# Patient Record
Sex: FEMALE | Race: BLACK OR AFRICAN AMERICAN
[De-identification: names, ages, dates, MRNs, and addresses within clinical notes are randomized per-mention and may not be internally consistent; named-entity substitution may affect disease eponyms.]

---

## 2018-03-05 ENCOUNTER — RESULT REVIEW (OUTPATIENT)
Age: 33
End: 2018-03-05

## 2018-03-19 PROBLEM — Z00.00 ENCOUNTER FOR PREVENTIVE HEALTH EXAMINATION: Status: ACTIVE | Noted: 2018-03-19

## 2018-03-20 ENCOUNTER — APPOINTMENT (OUTPATIENT)
Dept: ANTEPARTUM | Facility: CLINIC | Age: 33
End: 2018-03-20

## 2018-05-23 ENCOUNTER — APPOINTMENT (OUTPATIENT)
Dept: ANTEPARTUM | Facility: CLINIC | Age: 33
End: 2018-05-23
Payer: COMMERCIAL

## 2018-05-23 ENCOUNTER — ASOB RESULT (OUTPATIENT)
Age: 33
End: 2018-05-23

## 2018-05-23 PROCEDURE — 76811 OB US DETAILED SNGL FETUS: CPT

## 2018-05-23 PROCEDURE — 76817 TRANSVAGINAL US OBSTETRIC: CPT | Mod: 59

## 2018-10-02 ENCOUNTER — INPATIENT (INPATIENT)
Facility: HOSPITAL | Age: 33
LOS: 1 days | Discharge: HOME | End: 2018-10-04
Attending: OBSTETRICS & GYNECOLOGY | Admitting: OBSTETRICS & GYNECOLOGY
Payer: COMMERCIAL

## 2018-10-02 VITALS
OXYGEN SATURATION: 100 % | SYSTOLIC BLOOD PRESSURE: 116 MMHG | HEART RATE: 100 BPM | DIASTOLIC BLOOD PRESSURE: 72 MMHG | TEMPERATURE: 98 F | RESPIRATION RATE: 18 BRPM

## 2018-10-02 DIAGNOSIS — Z34.80 ENCOUNTER FOR SUPERVISION OF OTHER NORMAL PREGNANCY, UNSPECIFIED TRIMESTER: ICD-10-CM

## 2018-10-02 DIAGNOSIS — O26.899 OTHER SPECIFIED PREGNANCY RELATED CONDITIONS, UNSPECIFIED TRIMESTER: ICD-10-CM

## 2018-10-02 DIAGNOSIS — Z3A.00 WEEKS OF GESTATION OF PREGNANCY NOT SPECIFIED: ICD-10-CM

## 2018-10-02 LAB
BASOPHILS # BLD AUTO: 0.1 K/UL — SIGNIFICANT CHANGE UP (ref 0–0.2)
BASOPHILS NFR BLD AUTO: 0.7 % — SIGNIFICANT CHANGE UP (ref 0–2)
BLD GP AB SCN SERPL QL: NEGATIVE — SIGNIFICANT CHANGE UP
EOSINOPHIL # BLD AUTO: 0 K/UL — SIGNIFICANT CHANGE UP (ref 0–0.5)
EOSINOPHIL NFR BLD AUTO: 0.5 % — SIGNIFICANT CHANGE UP (ref 0–6)
GIANT PLATELETS BLD QL SMEAR: PRESENT — SIGNIFICANT CHANGE UP
HCT VFR BLD CALC: 30 % — LOW (ref 34.5–45)
HGB BLD-MCNC: 10.5 G/DL — LOW (ref 11.5–15.5)
LYMPHOCYTES # BLD AUTO: 2 K/UL — SIGNIFICANT CHANGE UP (ref 1–3.3)
LYMPHOCYTES # BLD AUTO: 25.5 % — SIGNIFICANT CHANGE UP (ref 13–44)
MCHC RBC-ENTMCNC: 25.4 PG — LOW (ref 27–34)
MCHC RBC-ENTMCNC: 34.9 GM/DL — SIGNIFICANT CHANGE UP (ref 32–36)
MCV RBC AUTO: 72.6 FL — LOW (ref 80–100)
MONOCYTES # BLD AUTO: 0.7 K/UL — SIGNIFICANT CHANGE UP (ref 0–0.9)
MONOCYTES NFR BLD AUTO: 8.7 % — SIGNIFICANT CHANGE UP (ref 2–14)
NEUTROPHILS # BLD AUTO: 4.9 K/UL — SIGNIFICANT CHANGE UP (ref 1.8–7.4)
NEUTROPHILS NFR BLD AUTO: 64.6 % — SIGNIFICANT CHANGE UP (ref 43–77)
PLAT MORPH BLD: ABNORMAL
PLATELET # BLD AUTO: 243 K/UL — SIGNIFICANT CHANGE UP (ref 150–400)
RBC # BLD: 4.13 M/UL — SIGNIFICANT CHANGE UP (ref 3.8–5.2)
RBC # FLD: 13.9 % — SIGNIFICANT CHANGE UP (ref 10.3–14.5)
RBC BLD AUTO: SIGNIFICANT CHANGE UP
RH IG SCN BLD-IMP: POSITIVE — SIGNIFICANT CHANGE UP
RH IG SCN BLD-IMP: POSITIVE — SIGNIFICANT CHANGE UP
T PALLIDUM AB TITR SER: NEGATIVE — SIGNIFICANT CHANGE UP
WBC # BLD: 7.6 K/UL — SIGNIFICANT CHANGE UP (ref 3.8–10.5)
WBC # FLD AUTO: 7.6 K/UL — SIGNIFICANT CHANGE UP (ref 3.8–10.5)

## 2018-10-02 RX ORDER — MAGNESIUM HYDROXIDE 400 MG/1
30 TABLET, CHEWABLE ORAL
Qty: 0 | Refills: 0 | Status: DISCONTINUED | OUTPATIENT
Start: 2018-10-03 | End: 2018-10-04

## 2018-10-02 RX ORDER — HYDROCORTISONE 1 %
1 OINTMENT (GRAM) TOPICAL EVERY 4 HOURS
Qty: 0 | Refills: 0 | Status: DISCONTINUED | OUTPATIENT
Start: 2018-10-03 | End: 2018-10-04

## 2018-10-02 RX ORDER — GLYCERIN ADULT
1 SUPPOSITORY, RECTAL RECTAL AT BEDTIME
Qty: 0 | Refills: 0 | Status: DISCONTINUED | OUTPATIENT
Start: 2018-10-03 | End: 2018-10-04

## 2018-10-02 RX ORDER — IBUPROFEN 200 MG
600 TABLET ORAL EVERY 6 HOURS
Qty: 0 | Refills: 0 | Status: COMPLETED | OUTPATIENT
Start: 2018-10-02 | End: 2019-08-31

## 2018-10-02 RX ORDER — SODIUM CHLORIDE 9 MG/ML
1000 INJECTION, SOLUTION INTRAVENOUS
Qty: 0 | Refills: 0 | Status: DISCONTINUED | OUTPATIENT
Start: 2018-10-02 | End: 2018-10-02

## 2018-10-02 RX ORDER — SODIUM CHLORIDE 9 MG/ML
500 INJECTION, SOLUTION INTRAVENOUS ONCE
Qty: 0 | Refills: 0 | Status: COMPLETED | OUTPATIENT
Start: 2018-10-02 | End: 2018-10-02

## 2018-10-02 RX ORDER — OXYTOCIN 10 UNIT/ML
4 VIAL (ML) INJECTION
Qty: 30 | Refills: 0 | Status: DISCONTINUED | OUTPATIENT
Start: 2018-10-02 | End: 2018-10-04

## 2018-10-02 RX ORDER — KETOROLAC TROMETHAMINE 30 MG/ML
30 SYRINGE (ML) INJECTION ONCE
Qty: 0 | Refills: 0 | Status: DISCONTINUED | OUTPATIENT
Start: 2018-10-02 | End: 2018-10-04

## 2018-10-02 RX ORDER — SODIUM CHLORIDE 9 MG/ML
3 INJECTION INTRAMUSCULAR; INTRAVENOUS; SUBCUTANEOUS EVERY 8 HOURS
Qty: 0 | Refills: 0 | Status: DISCONTINUED | OUTPATIENT
Start: 2018-10-02 | End: 2018-10-02

## 2018-10-02 RX ORDER — SIMETHICONE 80 MG/1
80 TABLET, CHEWABLE ORAL EVERY 6 HOURS
Qty: 0 | Refills: 0 | Status: DISCONTINUED | OUTPATIENT
Start: 2018-10-03 | End: 2018-10-04

## 2018-10-02 RX ORDER — CITRIC ACID/SODIUM CITRATE 300-500 MG
15 SOLUTION, ORAL ORAL EVERY 4 HOURS
Qty: 0 | Refills: 0 | Status: DISCONTINUED | OUTPATIENT
Start: 2018-10-02 | End: 2018-10-02

## 2018-10-02 RX ORDER — TETANUS TOXOID, REDUCED DIPHTHERIA TOXOID AND ACELLULAR PERTUSSIS VACCINE, ADSORBED 5; 2.5; 8; 8; 2.5 [IU]/.5ML; [IU]/.5ML; UG/.5ML; UG/.5ML; UG/.5ML
0.5 SUSPENSION INTRAMUSCULAR ONCE
Qty: 0 | Refills: 0 | Status: DISCONTINUED | OUTPATIENT
Start: 2018-10-03 | End: 2018-10-04

## 2018-10-02 RX ORDER — OXYCODONE HYDROCHLORIDE 5 MG/1
5 TABLET ORAL
Qty: 0 | Refills: 0 | Status: DISCONTINUED | OUTPATIENT
Start: 2018-10-02 | End: 2018-10-04

## 2018-10-02 RX ORDER — LANOLIN
1 OINTMENT (GRAM) TOPICAL EVERY 6 HOURS
Qty: 0 | Refills: 0 | Status: DISCONTINUED | OUTPATIENT
Start: 2018-10-03 | End: 2018-10-04

## 2018-10-02 RX ORDER — HYDROCORTISONE 1 %
1 OINTMENT (GRAM) TOPICAL EVERY 4 HOURS
Qty: 0 | Refills: 0 | Status: DISCONTINUED | OUTPATIENT
Start: 2018-10-02 | End: 2018-10-02

## 2018-10-02 RX ORDER — DIBUCAINE 1 %
1 OINTMENT (GRAM) RECTAL EVERY 4 HOURS
Qty: 0 | Refills: 0 | Status: DISCONTINUED | OUTPATIENT
Start: 2018-10-03 | End: 2018-10-04

## 2018-10-02 RX ORDER — DIPHENHYDRAMINE HCL 50 MG
25 CAPSULE ORAL EVERY 6 HOURS
Qty: 0 | Refills: 0 | Status: DISCONTINUED | OUTPATIENT
Start: 2018-10-03 | End: 2018-10-04

## 2018-10-02 RX ORDER — PRAMOXINE HYDROCHLORIDE 150 MG/15G
1 AEROSOL, FOAM RECTAL EVERY 4 HOURS
Qty: 0 | Refills: 0 | Status: DISCONTINUED | OUTPATIENT
Start: 2018-10-02 | End: 2018-10-02

## 2018-10-02 RX ORDER — ACETAMINOPHEN 500 MG
975 TABLET ORAL EVERY 6 HOURS
Qty: 0 | Refills: 0 | Status: COMPLETED | OUTPATIENT
Start: 2018-10-02 | End: 2019-08-31

## 2018-10-02 RX ORDER — OXYCODONE HYDROCHLORIDE 5 MG/1
5 TABLET ORAL EVERY 4 HOURS
Qty: 0 | Refills: 0 | Status: DISCONTINUED | OUTPATIENT
Start: 2018-10-02 | End: 2018-10-04

## 2018-10-02 RX ORDER — DOCUSATE SODIUM 100 MG
100 CAPSULE ORAL
Qty: 0 | Refills: 0 | Status: DISCONTINUED | OUTPATIENT
Start: 2018-10-03 | End: 2018-10-04

## 2018-10-02 RX ORDER — AER TRAVELER 0.5 G/1
1 SOLUTION RECTAL; TOPICAL EVERY 4 HOURS
Qty: 0 | Refills: 0 | Status: DISCONTINUED | OUTPATIENT
Start: 2018-10-03 | End: 2018-10-04

## 2018-10-02 RX ORDER — OXYTOCIN 10 UNIT/ML
41.67 VIAL (ML) INJECTION
Qty: 20 | Refills: 0 | Status: DISCONTINUED | OUTPATIENT
Start: 2018-10-02 | End: 2018-10-02

## 2018-10-02 RX ORDER — OXYTOCIN 10 UNIT/ML
333.33 VIAL (ML) INJECTION
Qty: 20 | Refills: 0 | Status: DISCONTINUED | OUTPATIENT
Start: 2018-10-02 | End: 2018-10-02

## 2018-10-02 RX ORDER — OXYTOCIN 10 UNIT/ML
41.67 VIAL (ML) INJECTION
Qty: 20 | Refills: 0 | Status: DISCONTINUED | OUTPATIENT
Start: 2018-10-03 | End: 2018-10-04

## 2018-10-02 RX ORDER — SODIUM CHLORIDE 9 MG/ML
3 INJECTION INTRAMUSCULAR; INTRAVENOUS; SUBCUTANEOUS EVERY 8 HOURS
Qty: 0 | Refills: 0 | Status: DISCONTINUED | OUTPATIENT
Start: 2018-10-03 | End: 2018-10-04

## 2018-10-02 RX ORDER — PRAMOXINE HYDROCHLORIDE 150 MG/15G
1 AEROSOL, FOAM RECTAL EVERY 4 HOURS
Qty: 0 | Refills: 0 | Status: DISCONTINUED | OUTPATIENT
Start: 2018-10-03 | End: 2018-10-04

## 2018-10-02 RX ORDER — DIBUCAINE 1 %
1 OINTMENT (GRAM) RECTAL EVERY 4 HOURS
Qty: 0 | Refills: 0 | Status: DISCONTINUED | OUTPATIENT
Start: 2018-10-02 | End: 2018-10-02

## 2018-10-02 RX ORDER — AER TRAVELER 0.5 G/1
1 SOLUTION RECTAL; TOPICAL EVERY 4 HOURS
Qty: 0 | Refills: 0 | Status: DISCONTINUED | OUTPATIENT
Start: 2018-10-02 | End: 2018-10-02

## 2018-10-02 RX ADMIN — Medication 4 MILLIUNIT(S)/MIN: at 16:56

## 2018-10-02 RX ADMIN — Medication 125 MILLIUNIT(S)/MIN: at 21:36

## 2018-10-02 RX ADMIN — SODIUM CHLORIDE 1000 MILLILITER(S): 9 INJECTION, SOLUTION INTRAVENOUS at 16:56

## 2018-10-02 RX ADMIN — SODIUM CHLORIDE 250 MILLILITER(S): 9 INJECTION, SOLUTION INTRAVENOUS at 16:56

## 2018-10-03 LAB
HCT VFR BLD CALC: 27.4 % — LOW (ref 34.5–45)
HGB BLD-MCNC: 9.6 G/DL — LOW (ref 11.5–15.5)

## 2018-10-03 RX ORDER — ASCORBIC ACID 60 MG
500 TABLET,CHEWABLE ORAL
Qty: 0 | Refills: 0 | Status: DISCONTINUED | OUTPATIENT
Start: 2018-10-03 | End: 2018-10-04

## 2018-10-03 RX ORDER — ACETAMINOPHEN 500 MG
975 TABLET ORAL EVERY 6 HOURS
Qty: 0 | Refills: 0 | Status: DISCONTINUED | OUTPATIENT
Start: 2018-10-03 | End: 2018-10-04

## 2018-10-03 RX ORDER — IBUPROFEN 200 MG
600 TABLET ORAL EVERY 6 HOURS
Qty: 0 | Refills: 0 | Status: DISCONTINUED | OUTPATIENT
Start: 2018-10-03 | End: 2018-10-04

## 2018-10-03 RX ORDER — FERROUS SULFATE 325(65) MG
325 TABLET ORAL
Qty: 0 | Refills: 0 | Status: DISCONTINUED | OUTPATIENT
Start: 2018-10-03 | End: 2018-10-04

## 2018-10-03 RX ADMIN — Medication 1 TABLET(S): at 12:12

## 2018-10-03 RX ADMIN — SODIUM CHLORIDE 3 MILLILITER(S): 9 INJECTION INTRAMUSCULAR; INTRAVENOUS; SUBCUTANEOUS at 06:36

## 2018-10-03 NOTE — CHART NOTE - NSCHARTNOTEFT_GEN_A_CORE
PA NOTE     Day 1         Vital Signs Last 24 Hrs  T(C): 36.8 (03 Oct 2018 06:39), Max: 37.1 (03 Oct 2018 02:00)  T(F): 98.2 (03 Oct 2018 06:39), Max: 98.8 (03 Oct 2018 02:00)  HR: 83 (03 Oct 2018 06:39) (65 - 100)  BP: 112/72 (03 Oct 2018 06:39) (112/72 - 127/59)  BP(mean): --  RR: 18 (03 Oct 2018 06:39) (16 - 20)  SpO2: 98% (02 Oct 2018 22:25) (97% - 100%)                          9.6    x     )-----------( x        ( 03 Oct 2018 07:00 )             27.4     9.6/27.4  10.5/30.0      Plan:  - Ferrous Sulfate, Colace, Vitamin C supplementation.  - Monitor for signs/symptoms of anemia.   ANDREA Huang who is in house  KATY Ibrahim PA-C

## 2018-10-03 NOTE — PROGRESS NOTE ADULT - PROBLEM SELECTOR PLAN 1
- Pain well controlled, continue current pain regimen  - Increase ambulation  - Continue regular diet    Liliana Dukes PGY-1

## 2018-10-04 ENCOUNTER — TRANSCRIPTION ENCOUNTER (OUTPATIENT)
Age: 33
End: 2018-10-04

## 2018-10-04 VITALS
HEART RATE: 83 BPM | DIASTOLIC BLOOD PRESSURE: 75 MMHG | TEMPERATURE: 98 F | RESPIRATION RATE: 18 BRPM | SYSTOLIC BLOOD PRESSURE: 112 MMHG

## 2018-10-04 PROCEDURE — 86850 RBC ANTIBODY SCREEN: CPT

## 2018-10-04 PROCEDURE — 86900 BLOOD TYPING SEROLOGIC ABO: CPT

## 2018-10-04 PROCEDURE — 59050 FETAL MONITOR W/REPORT: CPT

## 2018-10-04 PROCEDURE — G0463: CPT

## 2018-10-04 PROCEDURE — 86901 BLOOD TYPING SEROLOGIC RH(D): CPT

## 2018-10-04 PROCEDURE — 59025 FETAL NON-STRESS TEST: CPT

## 2018-10-04 PROCEDURE — 85018 HEMOGLOBIN: CPT

## 2018-10-04 PROCEDURE — 85027 COMPLETE CBC AUTOMATED: CPT

## 2018-10-04 PROCEDURE — 85014 HEMATOCRIT: CPT

## 2018-10-04 PROCEDURE — 86780 TREPONEMA PALLIDUM: CPT

## 2018-10-04 RX ORDER — ACETAMINOPHEN 500 MG
3 TABLET ORAL
Qty: 0 | Refills: 0 | COMMUNITY
Start: 2018-10-04

## 2018-10-04 RX ORDER — IBUPROFEN 200 MG
1 TABLET ORAL
Qty: 0 | Refills: 0 | COMMUNITY
Start: 2018-10-04

## 2018-10-04 RX ADMIN — Medication 500 MILLIGRAM(S): at 05:31

## 2018-10-04 RX ADMIN — Medication 325 MILLIGRAM(S): at 05:31

## 2018-10-04 NOTE — PROGRESS NOTE ADULT - PROBLEM SELECTOR PLAN 1
33y PPD#2 s/p  doing well.    Plan:  Analgesia prn  Regular diet  Discharge instruction given  F/U 6 weeks

## 2018-10-04 NOTE — DISCHARGE NOTE OB - MEDICATION SUMMARY - MEDICATIONS TO TAKE
I will START or STAY ON the medications listed below when I get home from the hospital:    acetaminophen 325 mg oral tablet  -- 3 tab(s) by mouth every 6 hours  -- Indication: For for pain    ibuprofen 600 mg oral tablet  -- 1 tab(s) by mouth every 6 hours  -- Indication: For for pain    Prenatal Multivitamins with Folic Acid 1 mg oral tablet  -- 1 tab(s) by mouth once a day  -- Indication: For general wellness

## 2018-10-04 NOTE — DISCHARGE NOTE OB - PATIENT PORTAL LINK FT
You can access the [facility], offered by [facility], by registering with the following website: [email or web address]

## 2018-10-04 NOTE — DISCHARGE NOTE OB - CARE PROVIDER_API CALL
Todd Garnett), Obstetrics and Gynecology  [address] [place] [address]  Phone: [phone]  Fax: [phone]

## 2018-10-04 NOTE — DISCHARGE NOTE OB - CARE PLAN
Principal Discharge DX:	Vaginal delivery  Goal:	establish breastfeeding  Assessment and plan of treatment:	PPD2 s/p , stable for discharge

## 2018-10-04 NOTE — PROGRESS NOTE ADULT - SUBJECTIVE AND OBJECTIVE BOX
OB Progress Note:  PPD#1    S: 32yo  PPD#1 s/p . Patient feels well. Pain is well controlled. She is tolerating a regular diet and passing flatus. She is voiding spontaneously, and ambulating without difficulty. Denies CP/SOB. Denies lightheadedness/dizziness. Denies N/V.    O:  Vitals:  Vital Signs Last 24 Hrs  T(C): 36.8 (03 Oct 2018 06:39), Max: 37.1 (03 Oct 2018 02:00)  T(F): 98.2 (03 Oct 2018 06:39), Max: 98.8 (03 Oct 2018 02:00)  HR: 83 (03 Oct 2018 06:39) (65 - 100)  BP: 112/72 (03 Oct 2018 06:39) (112/72 - 127/59)  BP(mean): --  RR: 18 (03 Oct 2018 06:39) (16 - 20)  SpO2: 98% (02 Oct 2018 22:25) (97% - 100%)    MEDICATIONS  (STANDING):  acetaminophen   Tablet .. 975 milliGRAM(s) Oral every 6 hours  diphtheria/tetanus/pertussis (acellular) Vaccine (ADAcel) 0.5 milliLiter(s) IntraMuscular once  ibuprofen  Tablet. 600 milliGRAM(s) Oral every 6 hours  ketorolac   Injectable 30 milliGRAM(s) IV Push once  oxyCODONE    IR 5 milliGRAM(s) Oral every 3 hours  oxytocin Infusion 4 milliUNIT(s)/Min (4 mL/Hr) IV Continuous <Continuous>  oxytocin Infusion 41.667 milliUNIT(s)/Min (125 mL/Hr) IV Continuous <Continuous>  prenatal multivitamin 1 Tablet(s) Oral daily  sodium chloride 0.9% lock flush 3 milliLiter(s) IV Push every 8 hours      Physical Exam:  General: NAD  Abdomen: soft, non-tender, non-distended, fundus firm  Vaginal: Lochia wnl  Extremities: NTTP, no erythema, no edema    Labs:  Blood type: O Positive  Rubella IgG: RPR: Negative                          10.5<L>   7.6 >-----------< 243    ( 10-02 @ 13:17 )             30.0<L>
OB Attending Note    S: Patient doing well. Minimal lochia. Pain controlled.    O: Vital Signs Last 24 Hrs  T(C): 36.8 (03 Oct 2018 06:39), Max: 37.1 (03 Oct 2018 02:00)  T(F): 98.2 (03 Oct 2018 06:39), Max: 98.8 (03 Oct 2018 02:00)  HR: 83 (03 Oct 2018 06:39) (65 - 100)  BP: 112/72 (03 Oct 2018 06:39) (112/72 - 127/59)      Gen: NAD  Abd: soft, NT, ND, fundus firm below umbilicus  Lochia: min  Perineum healing well  Ext: no tenderness    Labs:                        9.6    x     )-----------( x        ( 03 Oct 2018 07:00 )             27.4       A: 33y PPD#2 s/p  doing well.    Plan: cont PP care  OOB/ambulation  Pain control  iron supplementation
PPD#2- ATTENDING NOTE    S: Patient doing well. Minimal lochia. Pain controlled.    O: Vital Signs Last 24 Hrs  T(C): 36.8 (04 Oct 2018 05:00), Max: 36.8 (04 Oct 2018 05:00)  T(F): 98.2 (04 Oct 2018 05:00), Max: 98.2 (04 Oct 2018 05:00)  HR: 83 (04 Oct 2018 05:00) (83 - 83)  BP: 112/75 (04 Oct 2018 05:00) (112/75 - 112/75)  BP(mean): --  RR: 18 (04 Oct 2018 05:00) (18 - 18)  SpO2: --    Gen: NAD  Abd: soft, NT, ND, fundus firm below umbilicus  Lochia: moderate  Ext: no tenderness, no hyper reflexia    Labs:                        9.6    x     )-----------( x        ( 03 Oct 2018 07:00 )             27.4       A:       Office [phone]  Dr. Dai

## 2025-03-25 ENCOUNTER — INPATIENT (INPATIENT)
Facility: HOSPITAL | Age: 40
LOS: 0 days | Discharge: HOME | DRG: 833 | End: 2025-03-26
Attending: OBSTETRICS & GYNECOLOGY | Admitting: OBSTETRICS & GYNECOLOGY
Payer: COMMERCIAL

## 2025-03-25 VITALS
HEART RATE: 112 BPM | RESPIRATION RATE: 18 BRPM | DIASTOLIC BLOOD PRESSURE: 93 MMHG | TEMPERATURE: 98 F | WEIGHT: 132.06 LBS | SYSTOLIC BLOOD PRESSURE: 164 MMHG | OXYGEN SATURATION: 100 % | HEIGHT: 66 IN

## 2025-03-25 DIAGNOSIS — O00.90 UNSPECIFIED ECTOPIC PREGNANCY WITHOUT INTRAUTERINE PREGNANCY: ICD-10-CM

## 2025-03-25 LAB
ALBUMIN SERPL ELPH-MCNC: 4.3 G/DL — SIGNIFICANT CHANGE UP (ref 3.3–5)
ALP SERPL-CCNC: 42 U/L — SIGNIFICANT CHANGE UP (ref 40–120)
ALT FLD-CCNC: 11 U/L — SIGNIFICANT CHANGE UP (ref 10–45)
ANION GAP SERPL CALC-SCNC: 14 MMOL/L — SIGNIFICANT CHANGE UP (ref 5–17)
ANISOCYTOSIS BLD QL: SLIGHT — SIGNIFICANT CHANGE UP
APPEARANCE UR: CLEAR — SIGNIFICANT CHANGE UP
AST SERPL-CCNC: 13 U/L — SIGNIFICANT CHANGE UP (ref 10–40)
BACTERIA # UR AUTO: ABNORMAL /HPF
BASOPHILS # BLD AUTO: 0 K/UL — SIGNIFICANT CHANGE UP (ref 0–0.2)
BASOPHILS NFR BLD AUTO: 0 % — SIGNIFICANT CHANGE UP (ref 0–2)
BILIRUB SERPL-MCNC: 0.5 MG/DL — SIGNIFICANT CHANGE UP (ref 0.2–1.2)
BILIRUB UR-MCNC: NEGATIVE — SIGNIFICANT CHANGE UP
BLD GP AB SCN SERPL QL: NEGATIVE — SIGNIFICANT CHANGE UP
BUN SERPL-MCNC: 8 MG/DL — SIGNIFICANT CHANGE UP (ref 7–23)
CALCIUM SERPL-MCNC: 9.6 MG/DL — SIGNIFICANT CHANGE UP (ref 8.4–10.5)
CAST: 1 /LPF — SIGNIFICANT CHANGE UP (ref 0–4)
CHLORIDE SERPL-SCNC: 104 MMOL/L — SIGNIFICANT CHANGE UP (ref 96–108)
CO2 SERPL-SCNC: 19 MMOL/L — LOW (ref 22–31)
COLOR SPEC: YELLOW — SIGNIFICANT CHANGE UP
CREAT SERPL-MCNC: 0.59 MG/DL — SIGNIFICANT CHANGE UP (ref 0.5–1.3)
DACRYOCYTES BLD QL SMEAR: SLIGHT — SIGNIFICANT CHANGE UP
DIFF PNL FLD: ABNORMAL
EGFR: 119 ML/MIN/1.73M2 — SIGNIFICANT CHANGE UP
EGFR: 119 ML/MIN/1.73M2 — SIGNIFICANT CHANGE UP
EOSINOPHIL # BLD AUTO: 0 K/UL — SIGNIFICANT CHANGE UP (ref 0–0.5)
EOSINOPHIL NFR BLD AUTO: 0 % — SIGNIFICANT CHANGE UP (ref 0–6)
GIANT PLATELETS BLD QL SMEAR: PRESENT — SIGNIFICANT CHANGE UP
GLUCOSE SERPL-MCNC: 96 MG/DL — SIGNIFICANT CHANGE UP (ref 70–99)
GLUCOSE UR QL: NEGATIVE MG/DL — SIGNIFICANT CHANGE UP
HCG SERPL-ACNC: HIGH MIU/ML
HCG UR QL: POSITIVE
HCT VFR BLD CALC: 31.5 % — LOW (ref 34.5–45)
HCV AB S/CO SERPL IA: 0.06 S/CO — SIGNIFICANT CHANGE UP
HCV AB SERPL-IMP: SIGNIFICANT CHANGE UP
HGB BLD-MCNC: 11 G/DL — LOW (ref 11.5–15.5)
HIV 1 & 2 AB SERPL IA.RAPID: SIGNIFICANT CHANGE UP
HYPOCHROMIA BLD QL: SLIGHT — SIGNIFICANT CHANGE UP
KETONES UR-MCNC: 15 MG/DL
LEUKOCYTE ESTERASE UR-ACNC: NEGATIVE — SIGNIFICANT CHANGE UP
LYMPHOCYTES # BLD AUTO: 1.91 K/UL — SIGNIFICANT CHANGE UP (ref 1–3.3)
LYMPHOCYTES # BLD AUTO: 25.2 % — SIGNIFICANT CHANGE UP (ref 13–44)
MACROCYTES BLD QL: SLIGHT — SIGNIFICANT CHANGE UP
MANUAL SMEAR VERIFICATION: SIGNIFICANT CHANGE UP
MCHC RBC-ENTMCNC: 24.2 PG — LOW (ref 27–34)
MCHC RBC-ENTMCNC: 34.9 G/DL — SIGNIFICANT CHANGE UP (ref 32–36)
MCV RBC AUTO: 69.2 FL — LOW (ref 80–100)
MICROCYTES BLD QL: SIGNIFICANT CHANGE UP
MONOCYTES # BLD AUTO: 0.52 K/UL — SIGNIFICANT CHANGE UP (ref 0–0.9)
MONOCYTES NFR BLD AUTO: 6.9 % — SIGNIFICANT CHANGE UP (ref 2–14)
NEUTROPHILS # BLD AUTO: 5.07 K/UL — SIGNIFICANT CHANGE UP (ref 1.8–7.4)
NEUTROPHILS NFR BLD AUTO: 67 % — SIGNIFICANT CHANGE UP (ref 43–77)
NITRITE UR-MCNC: NEGATIVE — SIGNIFICANT CHANGE UP
OVALOCYTES BLD QL SMEAR: SLIGHT — SIGNIFICANT CHANGE UP
PH UR: 5.5 — SIGNIFICANT CHANGE UP (ref 5–8)
PLAT MORPH BLD: ABNORMAL
PLATELET # BLD AUTO: 342 K/UL — SIGNIFICANT CHANGE UP (ref 150–400)
POLYCHROMASIA BLD QL SMEAR: SLIGHT — SIGNIFICANT CHANGE UP
POTASSIUM SERPL-MCNC: 3.6 MMOL/L — SIGNIFICANT CHANGE UP (ref 3.5–5.3)
POTASSIUM SERPL-SCNC: 3.6 MMOL/L — SIGNIFICANT CHANGE UP (ref 3.5–5.3)
PROT SERPL-MCNC: 7.4 G/DL — SIGNIFICANT CHANGE UP (ref 6–8.3)
PROT UR-MCNC: NEGATIVE MG/DL — SIGNIFICANT CHANGE UP
RBC # BLD: 4.55 M/UL — SIGNIFICANT CHANGE UP (ref 3.8–5.2)
RBC # FLD: 15.6 % — HIGH (ref 10.3–14.5)
RBC BLD AUTO: ABNORMAL
RBC CASTS # UR COMP ASSIST: 1 /HPF — SIGNIFICANT CHANGE UP (ref 0–4)
REVIEW: SIGNIFICANT CHANGE UP
RH IG SCN BLD-IMP: POSITIVE — SIGNIFICANT CHANGE UP
SCHISTOCYTES BLD QL AUTO: SLIGHT — SIGNIFICANT CHANGE UP
SODIUM SERPL-SCNC: 137 MMOL/L — SIGNIFICANT CHANGE UP (ref 135–145)
SP GR SPEC: 1.02 — SIGNIFICANT CHANGE UP (ref 1–1.03)
SQUAMOUS # UR AUTO: 1 /HPF — SIGNIFICANT CHANGE UP (ref 0–5)
TARGETS BLD QL SMEAR: SLIGHT — SIGNIFICANT CHANGE UP
UROBILINOGEN FLD QL: 0.2 MG/DL — SIGNIFICANT CHANGE UP (ref 0.2–1)
VARIANT LYMPHS # BLD: 0.9 % — SIGNIFICANT CHANGE UP (ref 0–6)
VARIANT LYMPHS NFR BLD MANUAL: 0.9 % — SIGNIFICANT CHANGE UP (ref 0–6)
WBC # BLD: 7.56 K/UL — SIGNIFICANT CHANGE UP (ref 3.8–10.5)
WBC # FLD AUTO: 7.56 K/UL — SIGNIFICANT CHANGE UP (ref 3.8–10.5)
WBC UR QL: 2 /HPF — SIGNIFICANT CHANGE UP (ref 0–5)

## 2025-03-25 PROCEDURE — 99285 EMERGENCY DEPT VISIT HI MDM: CPT

## 2025-03-25 PROCEDURE — 76817 TRANSVAGINAL US OBSTETRIC: CPT | Mod: 26

## 2025-03-25 NOTE — PRE-ANESTHESIA EVALUATION ADULT - NSANTHPMHFT_GEN_ALL_CORE
38 y/o female; hct 31.5; 7 week 1 day right ectopic pregnancy   prior  40 y/o female; hct 31.5; 7 week 1 day right ectopic pregnancy   prior NSVDx2

## 2025-03-25 NOTE — H&P ADULT - NSHPPHYSICALEXAM_GEN_ALL_CORE
Vital Signs Last 24 Hrs  T(C): 36.7 (25 Mar 2025 19:45), Max: 36.8 (25 Mar 2025 19:02)  T(F): 98.1 (25 Mar 2025 19:45), Max: 98.2 (25 Mar 2025 19:02)  HR: 105 (25 Mar 2025 19:45) (105 - 112)  BP: 155/88 (25 Mar 2025 19:45) (155/88 - 164/93)  BP(mean): --  RR: 18 (25 Mar 2025 19:45) (18 - 18)  SpO2: 99% (25 Mar 2025 19:45) (99% - 100%)    Parameters below as of 25 Mar 2025 19:45  Patient On (Oxygen Delivery Method): room air      Physical Exam:   General: sitting comfortably in bed, NAD   CV: extremities well perfused  Lungs: normal respiratory effort on room air  Abd: Soft, non-tender to palpation, non-distended  :  deferred   Speculum Exam:: deferred   Ext: non-tender b/l, no edema

## 2025-03-25 NOTE — H&P ADULT - ASSESSMENT
39y  LMP 2024 here after being sent in from OBGYN office for ectopic pregnancy. TVUS completed ED consistent with ectopic pregnancy in the right adnexa with trace free-fluid in posterior cul-de-sac.   Patient is clinically and hemodynamically stable with benign physical exam, however [facility] patient is not an appropriate candidate for methotrexate therapy.  Will prepare patient for OR for diagnostic laparoscopy and unilateral salpingectomy.  NPO at 1230a.     Plan:   Neuro: Anesthesia to see   CV: Hemodynamically stable  Pulm: Saturating well on room air  GI: NPO   : magana to be placed in OR   Heme:, SCDs for DVT ppx, Early ambulation   FEN: LR@125.  replete electrolytes prn   ID: Afebrile  Endo: No active issues   Dispo: to OR     Felicia, PGY-1

## 2025-03-25 NOTE — ED PROVIDER NOTE - OBJECTIVE STATEMENT
Attendin-year-old female with no significant past medical history  presents for possible ectopic pregnancy.  Patient went to her gynecologist today because she wanted to get evaluated.  Her last menstrual period was at the end of January.  She does not take a home pregnancy test but was diagnosed as being pregnant at the gynecologist today.  An ultrasound was done and showed a possible ectopic pregnancy so she was referred to the emergency department for evaluation.  She is approximately 7 weeks and 2 days pregnant by dates.  She has no abdominal pain.  She has no vaginal bleeding.  Gynecologist did not send any reports regarding the ultrasound.  She told the patient that she would need to have a repeat ultrasound and blood work in the emergency department today.

## 2025-03-25 NOTE — ED ADULT NURSE NOTE - NSFALLRISKASMT_ED_ALL_ED_DT
25-Mar-2025 19:45 Acitretin Pregnancy And Lactation Text: This medication is Pregnancy Category X and should not be given to women who are pregnant or may become pregnant in the future. This medication is excreted in breast milk.

## 2025-03-25 NOTE — ED PROVIDER NOTE - CLINICAL SUMMARY MEDICAL DECISION MAKING FREE TEXT BOX
39-year-old female who is G3, P2 presents with new pregnancy today and possible ectopic pregnancy on outpatient ultrasound.  Patient has no complaints at this time.  Will get pelvic ultrasound to evaluate for location of pregnancy.  Will take check a beta-hCG and reassess.  Patient will likely need a GYN consult if she does have an ectopic pregnancy.

## 2025-03-25 NOTE — H&P ADULT - HISTORY OF PRESENT ILLNESS
TOSHIA TEE  39y  Female [id number]    HPI: 40 yo  LMP 25 presenting for ectopic pregnancy diagnosed in OB provider office. Seen by OBGYN today after "feeling like she was pregnant.." Urine pregnancy test positive and TVUS completed and revealed ectopic pregnancy. Patient currently denies CP/SOB/palpitations, dizziness/lightheadedness, Denies N/V. Denies abdominal pain, vaginal cramping, and vaginal bleeding. Patient last at at 4pm today.     Name of GYN Physician: [facility]   OBHx:     - 2014 FT, , 6#6   - 10/2018 FT, , 6#5   GynHx: Denies fibroids, cysts, endometriosis, STI's, Abnormal pap smears   PMH: denies  PSH: denies  Meds: denies  Allx: NKDA   Social History:  Denies smoking use, drug use, alcohol use.

## 2025-03-25 NOTE — ED ADULT NURSE NOTE - OBJECTIVE STATEMENT
39y F Pam x4 presents to the ED for possible ectopic pregnancy. Patient reports she was seen outpatient today for mild abdominal cramping and spotting and was told she had a positive ectopic pregnancy and to come to the ED. Patient is  and reports her LMP was at the end of January. Patient reports she did not know she was pregnant and went to the doctor because her period was late which is abnormal. Patient reports very mild spotting and cramping. Denies any complications with prior pregnancies. Denies PMH. Denies fever, chills, headache, blurry vision, dizziness, n/v/d, dysuria, weakness, numbness, tingling, SOB, and chest pain. Bed is in lowest position.

## 2025-03-25 NOTE — ED PROVIDER NOTE - PROGRESS NOTE DETAILS
June 9, 2020        Katy sIlas  [address]  [place] [address]    This letter provides a written record that you were tested for COVID-19 on 6/8/20.   Your result was negative.    This means that we didn t find the virus that causes COVID-19 in your sample. A test may show negative when you do actually have the virus. This can happen when the virus is in the early stages of infection, before you feel illness symptoms.    Even if you don t have symptoms, they may still appear. For safety, it s very important to follow these rules.    Keep yourself away from others (self-isolation):      Stay home. Don t go to work, school or anywhere else.     Stay in your own room (and use your own bathroom), if you can.    Stay away from others in your home. No hugging, kissing or shaking hands. No visitors.    Clean  high touch  surfaces often (doorknobs, counters, handles, etc.). Use a household cleaning spray or wipes.    Cover your mouth and nose with a mask, tissue or washcloth to avoid spreading germs.    Wash your hands and face often with soap and water.    Stay in self-isolation until you meet ALL of the guidelines below:    1. You have had no fever for at least 72 hours (that is 3 full days of no fever without the use of medicine that reduces fevers), AND  2. other symptoms (such as cough, shortness of breath) have gotten better, AND  3. at least 10 days have passed since your symptoms first appeared.    Going back to work  Check with your employer for any guidelines to follow for going back to work.    Employers: This document serves as formal notice that your employee tested negative for COVID-19, as of the testing date shown above.    For questions regarding this letter or your Negative COVID-19 result, call [phone] between 8A to 6:30P (M-F) and 10A to 6:30P (weekends).  
pt seen by OBGYN attending Dr. Samuels, recommended admission to Dr. Espitia service. - Telly Eldridge PA-C

## 2025-03-25 NOTE — ED PROVIDER NOTE - CADM POA PRESS ULCER
Prior Authorization Approval    Medication: RINVOQ 45 MG PO TB24  Authorization Effective Date: 5/23/2023  Authorization Expiration Date: 5/23/2024  Approved Dose/Quantity: 56/56  Reference #: [id number]   Insurance Company: [employer] [place] - Phone [phone] Fax [phone]  Expected CoPay:       CoPay Card Available:      Financial Assistance Needed:    Which Pharmacy is filling the prescription:    Pharmacy Notified:    Patient Notified:       No

## 2025-03-25 NOTE — H&P ADULT - NSHPLABSRESULTS_GEN_ALL_CORE
LABS:      Pregnancy Profile, Urine: Positive (25 @ 21:13)                          11.0   7.56  )-----------( 342      ( 25 Mar 2025 19:59 )             31.5         137  |  104  |  8   ----------------------------<  96  3.6   |  19[L]  |  0.59    Ca    9.6      25 Mar 2025 19:59    TPro  7.4  /  Alb  4.3  /  TBili  0.5  /  DBili  x   /  AST  13  /  ALT  11  /  AlkPhos  42      I&O's Detail      Urinalysis Basic - ( 25 Mar 2025 21:13 )    Color: Yellow / Appearance: Clear / S.020 / pH: x  Gluc: x / Ketone: 15 mg/dL  / Bili: Negative / Urobili: 0.2 mg/dL   Blood: x / Protein: Negative mg/dL / Nitrite: Negative   Leuk Esterase: Negative / RBC: 1 /HPF / WBC 2 /HPF   Sq Epi: x / Non Sq Epi: 1 /HPF / Bacteria: Few /HPF        RADIOLOGY & ADDITIONAL STUDIES:  PROCEDURE DATE:  2025          INTERPRETATION:  CLINICAL INFORMATION: Ectopic pregnancy at outpatient   doctor's office.    LMP: 2025    Estimated Gestational Age by LMP: 8 weeks and 3 days    COMPARISON: None available.    TECHNIQUE: Endovaginal and transabdominal pelvic sonogram. Color and   Spectral Doppler was performed.    FINDINGS:  Uterus: 8.3 cm x 4.8 cm x 5.4 cm. No intrauterine gestational sac.  Endometrium: Thickened measuring 9 mm.    Right ovary: 3.4 cm x 1.7 cm x 2.3 cm. Within normal limits. Color flow   demonstrated. Right adnexal thick-walled cystic mass measuring 5.2 cm x   4.8 cm x 5.2 cm which abuts and may be contiguous with the uterus   containing a fetal pole with crown-rump length measuring 1.1 cm   corresponding to a 7 week 1 day gestation and no associated fetal   heartbeat compatible with an ectopic pregnancy possibly an interstitial   ectopic pregnancy.  Left ovary: 3.1 cm x 1.3 cm x 1.6 cm. Within normal limits. Color flow   demonstrated.    Fluid: Trace free fluid in the posterior cul-de-sac.    IMPRESSION:  Right adnexal 7 week 1 day ectopic pregnancy without evidence of a fetal   heartbeat.        --- End of Report ---    CAYDEN MCCRACKEN MD; Attending Radiologist  This document has been electronically signed. Mar 25 2025 10:06PM

## 2025-03-26 ENCOUNTER — TRANSCRIPTION ENCOUNTER (OUTPATIENT)
Age: 40
End: 2025-03-26

## 2025-03-26 ENCOUNTER — RESULT REVIEW (OUTPATIENT)
Age: 40
End: 2025-03-26

## 2025-03-26 VITALS
HEART RATE: 73 BPM | SYSTOLIC BLOOD PRESSURE: 130 MMHG | DIASTOLIC BLOOD PRESSURE: 58 MMHG | TEMPERATURE: 98 F | RESPIRATION RATE: 16 BRPM | OXYGEN SATURATION: 100 %

## 2025-03-26 LAB
APTT BLD: 28.9 SEC — SIGNIFICANT CHANGE UP (ref 24.5–35.6)
INR BLD: 1.15 RATIO — SIGNIFICANT CHANGE UP (ref 0.85–1.16)
PROTHROM AB SERPL-ACNC: 13.2 SEC — SIGNIFICANT CHANGE UP (ref 9.9–13.4)

## 2025-03-26 PROCEDURE — 81025 URINE PREGNANCY TEST: CPT

## 2025-03-26 PROCEDURE — 59136 TREAT ECTOPIC PREGNANCY: CPT

## 2025-03-26 PROCEDURE — C9399: CPT

## 2025-03-26 PROCEDURE — 86901 BLOOD TYPING SEROLOGIC RH(D): CPT

## 2025-03-26 PROCEDURE — 86850 RBC ANTIBODY SCREEN: CPT

## 2025-03-26 PROCEDURE — 88305 TISSUE EXAM BY PATHOLOGIST: CPT | Mod: 26

## 2025-03-26 PROCEDURE — 76817 TRANSVAGINAL US OBSTETRIC: CPT

## 2025-03-26 PROCEDURE — 88305 TISSUE EXAM BY PATHOLOGIST: CPT

## 2025-03-26 PROCEDURE — C1889: CPT

## 2025-03-26 PROCEDURE — 84702 CHORIONIC GONADOTROPIN TEST: CPT

## 2025-03-26 PROCEDURE — 85730 THROMBOPLASTIN TIME PARTIAL: CPT

## 2025-03-26 PROCEDURE — 80053 COMPREHEN METABOLIC PANEL: CPT

## 2025-03-26 PROCEDURE — C1765: CPT

## 2025-03-26 PROCEDURE — 81001 URINALYSIS AUTO W/SCOPE: CPT

## 2025-03-26 PROCEDURE — 86803 HEPATITIS C AB TEST: CPT

## 2025-03-26 PROCEDURE — 85610 PROTHROMBIN TIME: CPT

## 2025-03-26 PROCEDURE — 86703 HIV-1/HIV-2 1 RESULT ANTBDY: CPT

## 2025-03-26 PROCEDURE — 86900 BLOOD TYPING SEROLOGIC ABO: CPT

## 2025-03-26 PROCEDURE — 99285 EMERGENCY DEPT VISIT HI MDM: CPT

## 2025-03-26 PROCEDURE — 85025 COMPLETE CBC W/AUTO DIFF WBC: CPT

## 2025-03-26 RX ORDER — ONDANSETRON HCL/PF 4 MG/2 ML
4 VIAL (ML) INJECTION ONCE
Refills: 0 | Status: DISCONTINUED | OUTPATIENT
Start: 2025-03-26 | End: 2025-03-26

## 2025-03-26 RX ORDER — HYDROMORPHONE/SOD CHLOR,ISO/PF 2 MG/10 ML
0.5 SYRINGE (ML) INJECTION
Refills: 0 | Status: DISCONTINUED | OUTPATIENT
Start: 2025-03-26 | End: 2025-03-26

## 2025-03-26 RX ORDER — OXYCODONE HYDROCHLORIDE 30 MG/1
1 TABLET ORAL
Qty: 10 | Refills: 0
Start: 2025-03-26

## 2025-03-26 NOTE — ASU DISCHARGE PLAN (ADULT/PEDIATRIC) - FINANCIAL ASSISTANCE
[facility] provides services at a reduced cost to those who are determined to be eligible through [facility]’s financial assistance program. Information regarding [facility]’s financial assistance program can be found by going to [email or web address] or by calling [phone].

## 2025-03-26 NOTE — BRIEF OPERATIVE NOTE - OPERATION/FINDINGS
Grossly normal uterus and bilateral ovaries. Grossly normal left fallopian tube. Right cornua of uterus and right fallopian tube with ectopic pregnancy.

## 2025-03-26 NOTE — CHART NOTE - NSCHARTNOTEFT_GEN_A_CORE
Post-op Check     38 yo  s/p dx lsc, right salpingectomy, right cornual resection for right ectopic pregnancy.   Patient seen and examined at bedside. Pain well controlled   Patient is ambulating and tolerating general diet.   Patient is voiding spontaneously.   Denies CP, SOB, N/V, fevers, and chills.    Vital Signs Last 24 Hours  T(C): 36.7 (25 @ 05:30), Max: 36.9 (25 @ 23:30)  HR: 73 (25 @ 05:30) (73 - 112)  BP: 130/58 (25 @ 05:30) (106/60 - 164/93)  RR: 16 (25 @ 05:30) (14 - 18)  SpO2: 100% (25 @ 05:30) (98% - 100%)    I&O's Summary    25 Mar 2025 07:01  -  26 Mar 2025 05:38  --------------------------------------------------------  IN: 240 mL / OUT: 200 mL / NET: 40 mL      Physical Exam:  General: NAD  CV: RRR  Lungs: CTA b/l  Abdomen: Soft, appropriately-tender, non-distended.   Incision: umbilical incision with dressing with minimal strikethrough. RLQ/LLQ LSC incision sites c/d/i with steri strips   : no bleeding on pad  Ext: No pain or swelling     Labs:                        11.0   7.56  )-----------( 342      ( 25 Mar 2025 19:59 )             31.5   baso x      eos x      imm gran x      lymph x      mono x      poly x          MEDICATIONS  (STANDING):    MEDICATIONS  (PRN):  HYDROmorphone  Injectable 0.5 milliGRAM(s) IV Push every 10 minutes PRN Moderate Pain (4 - 6)  ondansetron Injectable 4 milliGRAM(s) IV Push once PRN Nausea and/or Vomiting      A/P: 38 yo  s/p dx lsc, right salpingectomy, right cornual resection for right ectopic pregnancy. Patient is stable and doing well post-operatively.     Neuro: PO pain meds.   CV: Hemodynamically stable  Pulm: Saturating well on room air  GI: Continue regular diet  : Voiding spontaneously  Heme: Ambulation   FEN: LR@125.  replete electrolytes prn   ID: Afebrile  Endo: No active issues   Dispo: Home     Felicia, PGY-1

## 2025-03-26 NOTE — BRIEF OPERATIVE NOTE - NSICDXBRIEFPROCEDURE_GEN_ALL_CORE_FT
PROCEDURES:  Diagnostic laparoscopy 26-Mar-2025 04:32:34  Alpa Sanchez  Right salpingectomy 26-Mar-2025 04:32:44 with right cornual wedge resection Alpa Sanchez

## 2025-03-26 NOTE — ASU DISCHARGE PLAN (ADULT/PEDIATRIC) - ASU DC SPECIAL INSTRUCTIONSFT
After your surgery it is normal to experience:  •	Vaginal bleeding- can last 1-2 weeks and should not be heavier than a period. It may come and go and be red, brown or pink. Use pads, not tampons.  •	Cramping- Is common especially within the first 24 hours. This should be relieved by taking over the counter motrin, advil or Tylenol.      Physician Notification- Warning signs to look out for  •	Heavy Vaginal Bleeding   •	Shortness of breath or chest pain  •	Severe Abdominal Pain  •	Persistent nausea and vomiting  •	Pain not relieved by medications  •	Fever greater than 100.4®F  •	Inability to tolerate liquids or foods  •	Unable to urinate after 8 hours

## 2025-03-26 NOTE — ASU DISCHARGE PLAN (ADULT/PEDIATRIC) - CARE PROVIDER_API CALL
Brando Samuels  Obstetrics and Gynecology  [address] [place] [address], [facility] [address]  [place] [address]  Phone: [phone]  Fax: [phone]  Follow Up Time: 2 weeks

## 2025-03-27 RX ORDER — OXYCODONE HYDROCHLORIDE 30 MG/1
1 TABLET ORAL
Qty: 10 | Refills: 0
Start: 2025-03-27

## 2025-03-27 NOTE — CHART NOTE - NSCHARTNOTEFT_GEN_A_CORE
Patient called because phx could not fill oxy prescription. Called phx and they can now fill prescription. Patient without fever or chills, no chest pain or SOB, states minimal vaginal bleeding. Has POC scheduled.  Jerardo PGY3

## 2025-03-28 LAB — SURGICAL PATHOLOGY STUDY: SIGNIFICANT CHANGE UP

## 2025-04-14 ENCOUNTER — APPOINTMENT (OUTPATIENT)
Dept: OBGYN | Facility: CLINIC | Age: 40
End: 2025-04-14
Payer: COMMERCIAL

## 2025-04-14 VITALS
HEIGHT: 65 IN | DIASTOLIC BLOOD PRESSURE: 80 MMHG | SYSTOLIC BLOOD PRESSURE: 134 MMHG | WEIGHT: 129 LBS | BODY MASS INDEX: 21.49 KG/M2

## 2025-04-14 DIAGNOSIS — O00.80 OTHER. ECTOPIC PREGNANCY WITHOUT INTRAUTERINE PREGNANCY: ICD-10-CM

## 2025-04-14 DIAGNOSIS — Z30.09 ENCOUNTER FOR OTHER GENERAL COUNSELING AND ADVICE ON CONTRACEPTION: ICD-10-CM

## 2025-04-14 PROCEDURE — 99024 POSTOP FOLLOW-UP VISIT: CPT

## 2025-04-14 RX ORDER — NORETHINDRONE ACETATE AND ETHINYL ESTRADIOL AND FERROUS FUMARATE 1MG-20(24)
1-20 KIT ORAL
Qty: 1 | Refills: 11 | Status: ACTIVE | COMMUNITY
Start: 2025-04-14 | End: 1900-01-01

## 2025-04-15 PROBLEM — O00.80 OTHER ECTOPIC PREGNANCY WITHOUT INTRAUTERINE PREGNANCY: Status: ACTIVE | Noted: 2025-04-15

## 2025-04-28 ENCOUNTER — NON-APPOINTMENT (OUTPATIENT)
Age: 40
End: 2025-04-28

## 2025-05-28 ENCOUNTER — APPOINTMENT (OUTPATIENT)
Dept: OBGYN | Facility: CLINIC | Age: 40
End: 2025-05-28
Payer: COMMERCIAL

## 2025-05-28 VITALS
DIASTOLIC BLOOD PRESSURE: 84 MMHG | WEIGHT: 134 LBS | BODY MASS INDEX: 22.33 KG/M2 | SYSTOLIC BLOOD PRESSURE: 138 MMHG | HEIGHT: 65 IN

## 2025-05-28 DIAGNOSIS — Z00.00 ENCOUNTER FOR GENERAL ADULT MEDICAL EXAMINATION W/OUT ABNORMAL FINDINGS: ICD-10-CM

## 2025-05-28 PROCEDURE — 99212 OFFICE O/P EST SF 10 MIN: CPT | Mod: 24

## 2025-08-12 ENCOUNTER — NON-APPOINTMENT (OUTPATIENT)
Age: 40
End: 2025-08-12

## 2025-08-13 ENCOUNTER — APPOINTMENT (OUTPATIENT)
Dept: OBGYN | Facility: CLINIC | Age: 40
End: 2025-08-13